# Patient Record
Sex: MALE | Race: WHITE | NOT HISPANIC OR LATINO | RURAL
[De-identification: names, ages, dates, MRNs, and addresses within clinical notes are randomized per-mention and may not be internally consistent; named-entity substitution may affect disease eponyms.]

---

## 2021-05-29 ENCOUNTER — HOSPITAL ENCOUNTER (EMERGENCY)
Facility: HOSPITAL | Age: 51
Discharge: LEFT BEFORE DISCHARGE | End: 2021-05-29
Payer: MEDICAID

## 2021-05-29 VITALS
BODY MASS INDEX: 22.68 KG/M2 | OXYGEN SATURATION: 99 % | TEMPERATURE: 98 F | WEIGHT: 162 LBS | DIASTOLIC BLOOD PRESSURE: 89 MMHG | HEART RATE: 73 BPM | RESPIRATION RATE: 18 BRPM | SYSTOLIC BLOOD PRESSURE: 148 MMHG | HEIGHT: 71 IN

## 2021-05-29 DIAGNOSIS — R07.9 CHEST PAIN: ICD-10-CM

## 2021-05-29 LAB
ALBUMIN SERPL BCP-MCNC: 4 G/DL (ref 3.5–5)
ALBUMIN/GLOB SERPL: 0.9 {RATIO}
ALP SERPL-CCNC: 76 U/L (ref 45–115)
ALT SERPL W P-5'-P-CCNC: 28 U/L (ref 16–61)
ANION GAP SERPL CALCULATED.3IONS-SCNC: 18 MMOL/L (ref 7–16)
AST SERPL W P-5'-P-CCNC: 45 U/L (ref 15–37)
BASOPHILS # BLD AUTO: 0.04 K/UL (ref 0–0.2)
BASOPHILS NFR BLD AUTO: 0.1 % (ref 0–1)
BILIRUB SERPL-MCNC: 0.6 MG/DL (ref 0–1.2)
BUN SERPL-MCNC: 16 MG/DL (ref 7–18)
BUN/CREAT SERPL: 16 (ref 6–20)
CALCIUM SERPL-MCNC: 8.8 MG/DL (ref 8.5–10.1)
CHLORIDE SERPL-SCNC: 104 MMOL/L (ref 98–107)
CO2 SERPL-SCNC: 23 MMOL/L (ref 21–32)
CREAT SERPL-MCNC: 1 MG/DL (ref 0.7–1.3)
DIFFERENTIAL METHOD BLD: ABNORMAL
EOSINOPHIL # BLD AUTO: 0.1 K/UL (ref 0–0.5)
EOSINOPHIL NFR BLD AUTO: 0.8 % (ref 1–4)
ERYTHROCYTE [DISTWIDTH] IN BLOOD BY AUTOMATED COUNT: 12.6 % (ref 11.5–14.5)
GLOBULIN SER-MCNC: 4.5 G/DL (ref 2–4)
GLUCOSE SERPL-MCNC: 124 MG/DL (ref 74–106)
HCT VFR BLD AUTO: 49.6 % (ref 40–54)
HGB BLD-MCNC: 17.1 G/DL (ref 13.5–18)
LYMPHOCYTES # BLD AUTO: 1.18 K/UL (ref 1–4.8)
LYMPHOCYTES NFR BLD AUTO: 9.4 % (ref 27–41)
MCH RBC QN AUTO: 33.1 PG (ref 27–31)
MCHC RBC AUTO-ENTMCNC: 34.5 G/DL (ref 32–36)
MCV RBC AUTO: 96.1 FL (ref 80–96)
MONOCYTES # BLD AUTO: 0.78 K/UL (ref 0–0.8)
MONOCYTES NFR BLD AUTO: 5.1 % (ref 2–6)
MPC BLD CALC-MCNC: 11.7 FL (ref 9.4–12.4)
NEUTROPHILS # BLD AUTO: 10.93 K/UL (ref 1.8–7.7)
NEUTROPHILS NFR BLD AUTO: 84.6 % (ref 53–65)
NT-PROBNP SERPL-MCNC: 26 PG/ML (ref 1–125)
PLATELET # BLD AUTO: 214 K/UL (ref 150–400)
POTASSIUM SERPL-SCNC: 5.7 MMOL/L (ref 3.5–5.1)
PROT SERPL-MCNC: 8.5 G/DL (ref 6.4–8.2)
RBC # BLD AUTO: 5.16 M/UL (ref 4.6–6.2)
SODIUM SERPL-SCNC: 139 MMOL/L (ref 136–145)
TROPONIN I SERPL-MCNC: <0.017 NG/ML
WBC # BLD AUTO: 13.57 K/UL (ref 4.5–11)

## 2021-05-29 PROCEDURE — 80053 COMPREHEN METABOLIC PANEL: CPT | Performed by: NURSE PRACTITIONER

## 2021-05-29 PROCEDURE — 84484 ASSAY OF TROPONIN QUANT: CPT | Performed by: NURSE PRACTITIONER

## 2021-05-29 PROCEDURE — 99283 PR EMERGENCY DEPT VISIT,LEVEL III: ICD-10-PCS | Mod: ,,, | Performed by: NURSE PRACTITIONER

## 2021-05-29 PROCEDURE — 94760 N-INVAS EAR/PLS OXIMETRY 1: CPT

## 2021-05-29 PROCEDURE — 83880 ASSAY OF NATRIURETIC PEPTIDE: CPT | Performed by: NURSE PRACTITIONER

## 2021-05-29 PROCEDURE — 36415 COLL VENOUS BLD VENIPUNCTURE: CPT | Performed by: NURSE PRACTITIONER

## 2021-05-29 PROCEDURE — 85025 COMPLETE CBC W/AUTO DIFF WBC: CPT | Performed by: NURSE PRACTITIONER

## 2021-05-29 PROCEDURE — 93005 ELECTROCARDIOGRAM TRACING: CPT

## 2021-05-29 PROCEDURE — 99284 EMERGENCY DEPT VISIT MOD MDM: CPT | Mod: 25

## 2021-05-29 PROCEDURE — 99283 EMERGENCY DEPT VISIT LOW MDM: CPT | Mod: ,,, | Performed by: NURSE PRACTITIONER

## 2021-05-29 PROCEDURE — 25000003 PHARM REV CODE 250: Performed by: NURSE PRACTITIONER

## 2021-05-29 PROCEDURE — 93010 ELECTROCARDIOGRAM REPORT: CPT | Mod: ,,, | Performed by: FAMILY MEDICINE

## 2021-05-29 PROCEDURE — 93010 EKG 12-LEAD: ICD-10-PCS | Mod: ,,, | Performed by: FAMILY MEDICINE

## 2021-05-29 RX ORDER — ASPIRIN 325 MG
325 TABLET ORAL
Status: COMPLETED | OUTPATIENT
Start: 2021-05-29 | End: 2021-05-29

## 2021-05-29 RX ADMIN — ASPIRIN 325 MG ORAL TABLET 325 MG: 325 PILL ORAL at 11:05

## 2024-01-01 ENCOUNTER — HOSPITAL ENCOUNTER (EMERGENCY)
Facility: HOSPITAL | Age: 54
End: 2024-05-26
Attending: EMERGENCY MEDICINE
Payer: MEDICAID

## 2024-01-01 VITALS
TEMPERATURE: 97 F | HEART RATE: 83 BPM | OXYGEN SATURATION: 100 % | SYSTOLIC BLOOD PRESSURE: 146 MMHG | DIASTOLIC BLOOD PRESSURE: 93 MMHG | RESPIRATION RATE: 31 BRPM

## 2024-01-01 DIAGNOSIS — I21.3 STEMI (ST ELEVATION MYOCARDIAL INFARCTION): ICD-10-CM

## 2024-01-01 DIAGNOSIS — I46.9 CARDIAC ARREST: Primary | ICD-10-CM

## 2024-01-01 DIAGNOSIS — I21.3 ST ELEVATION MYOCARDIAL INFARCTION (STEMI), UNSPECIFIED ARTERY: ICD-10-CM

## 2024-01-01 PROCEDURE — 31500 INSERT EMERGENCY AIRWAY: CPT

## 2024-01-01 PROCEDURE — 63600175 PHARM REV CODE 636 W HCPCS: Performed by: INTERNAL MEDICINE

## 2024-01-01 PROCEDURE — 25000003 PHARM REV CODE 250: Performed by: INTERNAL MEDICINE

## 2024-01-01 PROCEDURE — C1725 CATH, TRANSLUMIN NON-LASER: HCPCS | Performed by: INTERNAL MEDICINE

## 2024-01-01 PROCEDURE — 92950 HEART/LUNG RESUSCITATION CPR: CPT

## 2024-01-01 PROCEDURE — 93458 L HRT ARTERY/VENTRICLE ANGIO: CPT | Mod: XU | Performed by: INTERNAL MEDICINE

## 2024-01-01 PROCEDURE — 92920 PRQ TRLUML C ANGIOP 1ART&/BR: CPT | Mod: LC,,, | Performed by: INTERNAL MEDICINE

## 2024-01-01 PROCEDURE — C1769 GUIDE WIRE: HCPCS | Performed by: INTERNAL MEDICINE

## 2024-01-01 PROCEDURE — 27201423 OPTIME MED/SURG SUP & DEVICES STERILE SUPPLY: Performed by: INTERNAL MEDICINE

## 2024-01-01 PROCEDURE — 94002 VENT MGMT INPAT INIT DAY: CPT

## 2024-01-01 PROCEDURE — 99285 EMERGENCY DEPT VISIT HI MDM: CPT | Mod: 25

## 2024-01-01 PROCEDURE — C1887 CATHETER, GUIDING: HCPCS | Performed by: INTERNAL MEDICINE

## 2024-01-01 PROCEDURE — 85347 COAGULATION TIME ACTIVATED: CPT

## 2024-01-01 PROCEDURE — 92920 PRQ TRLUML C ANGIOP 1ART&/BR: CPT | Mod: LC | Performed by: INTERNAL MEDICINE

## 2024-01-01 PROCEDURE — 92950 HEART/LUNG RESUSCITATION CPR: CPT | Mod: 51,,, | Performed by: INTERNAL MEDICINE

## 2024-01-01 PROCEDURE — 25500020 PHARM REV CODE 255: Performed by: INTERNAL MEDICINE

## 2024-01-01 PROCEDURE — C1894 INTRO/SHEATH, NON-LASER: HCPCS | Performed by: INTERNAL MEDICINE

## 2024-01-01 PROCEDURE — 99900035 HC TECH TIME PER 15 MIN (STAT)

## 2024-01-01 PROCEDURE — 27000221 HC OXYGEN, UP TO 24 HOURS

## 2024-01-01 PROCEDURE — 93458 L HRT ARTERY/VENTRICLE ANGIO: CPT | Mod: 26,XU,51, | Performed by: INTERNAL MEDICINE

## 2024-01-01 PROCEDURE — 63600150 PHARM REV CODE 636: Performed by: INTERNAL MEDICINE

## 2024-01-01 RX ORDER — DOPAMINE HYDROCHLORIDE 320 MG/100ML
INJECTION, SOLUTION INTRAVENOUS
Status: DISCONTINUED | OUTPATIENT
Start: 2024-01-01 | End: 2024-07-31 | Stop reason: HOSPADM

## 2024-01-01 RX ORDER — LIDOCAINE HYDROCHLORIDE 20 MG/ML
INJECTION INTRAVENOUS
Status: DISCONTINUED | OUTPATIENT
Start: 2024-01-01 | End: 2024-07-31 | Stop reason: HOSPADM

## 2024-01-01 RX ORDER — EPINEPHRINE 0.1 MG/ML
INJECTION INTRAVENOUS
Status: DISCONTINUED | OUTPATIENT
Start: 2024-01-01 | End: 2024-07-31 | Stop reason: HOSPADM

## 2024-01-01 RX ORDER — AMIODARONE HYDROCHLORIDE 150 MG/3ML
INJECTION, SOLUTION INTRAVENOUS
Status: DISCONTINUED | OUTPATIENT
Start: 2024-01-01 | End: 2024-07-31 | Stop reason: HOSPADM

## 2024-01-01 RX ORDER — INDOMETHACIN 25 MG/1
CAPSULE ORAL
Status: DISCONTINUED | OUTPATIENT
Start: 2024-01-01 | End: 2024-07-31 | Stop reason: HOSPADM

## 2024-01-01 RX ORDER — SODIUM CHLORIDE 9 MG/ML
INJECTION, SOLUTION INTRAVENOUS
Status: DISCONTINUED | OUTPATIENT
Start: 2024-01-01 | End: 2024-07-31 | Stop reason: HOSPADM

## 2024-01-01 RX ORDER — HEPARIN SODIUM 1000 [USP'U]/ML
INJECTION, SOLUTION INTRAVENOUS; SUBCUTANEOUS
Status: DISCONTINUED | OUTPATIENT
Start: 2024-01-01 | End: 2024-07-31 | Stop reason: HOSPADM

## 2024-01-01 RX ORDER — LIDOCAINE HYDROCHLORIDE 10 MG/ML
INJECTION INFILTRATION; PERINEURAL
Status: DISCONTINUED | OUTPATIENT
Start: 2024-01-01 | End: 2024-07-31 | Stop reason: HOSPADM

## 2024-05-26 ENCOUNTER — HOSPITAL ENCOUNTER (EMERGENCY)
Facility: HOSPITAL | Age: 54
Discharge: ANOTHER HOSPITAL | End: 2024-05-26
Attending: EMERGENCY MEDICINE
Payer: MEDICAID

## 2024-05-26 VITALS
BODY MASS INDEX: 23.71 KG/M2 | WEIGHT: 170 LBS | SYSTOLIC BLOOD PRESSURE: 180 MMHG | OXYGEN SATURATION: 99 % | DIASTOLIC BLOOD PRESSURE: 100 MMHG | RESPIRATION RATE: 20 BRPM | TEMPERATURE: 95 F | HEART RATE: 89 BPM

## 2024-05-26 DIAGNOSIS — I21.3 ACUTE ST ELEVATION MYOCARDIAL INFARCTION (STEMI), UNSPECIFIED ARTERY: ICD-10-CM

## 2024-05-26 DIAGNOSIS — I46.9 CARDIOPULMONARY ARREST: Primary | ICD-10-CM

## 2024-05-26 PROBLEM — R07.2 PRECORDIAL PAIN: Status: ACTIVE | Noted: 2024-05-26

## 2024-05-26 PROBLEM — Z72.0 TOBACCO ABUSE: Status: RESOLVED | Noted: 2024-05-26 | Resolved: 2024-05-26

## 2024-05-26 PROBLEM — Z72.0 TOBACCO ABUSE: Status: ACTIVE | Noted: 2024-05-26

## 2024-05-26 PROBLEM — R40.2440: Status: RESOLVED | Noted: 2024-05-26 | Resolved: 2024-05-26

## 2024-05-26 PROBLEM — R40.2440: Status: ACTIVE | Noted: 2024-05-26

## 2024-05-26 LAB
ALBUMIN SERPL BCP-MCNC: 3.2 G/DL (ref 3.5–5)
ALBUMIN/GLOB SERPL: 1 {RATIO}
ALP SERPL-CCNC: 78 U/L (ref 45–115)
ALT SERPL W P-5'-P-CCNC: 57 U/L (ref 16–61)
ANION GAP SERPL CALCULATED.3IONS-SCNC: 27 MMOL/L (ref 7–16)
AST SERPL W P-5'-P-CCNC: 91 U/L (ref 15–37)
BACTERIA #/AREA URNS HPF: ABNORMAL /HPF
BASOPHILS # BLD AUTO: 0.03 K/UL (ref 0–0.2)
BASOPHILS NFR BLD AUTO: 0.3 % (ref 0–1)
BILIRUB SERPL-MCNC: 0.6 MG/DL (ref ?–1.2)
BILIRUB UR QL STRIP: NEGATIVE
BUN SERPL-MCNC: 20 MG/DL (ref 7–18)
BUN/CREAT SERPL: 11 (ref 6–20)
CALCIUM SERPL-MCNC: 7 MG/DL (ref 8.5–10.1)
CHLORIDE SERPL-SCNC: 104 MMOL/L (ref 98–107)
CLARITY UR: CLEAR
CO2 SERPL-SCNC: 13 MMOL/L (ref 21–32)
COLOR UR: YELLOW
CREAT SERPL-MCNC: 1.75 MG/DL (ref 0.7–1.3)
DIFFERENTIAL METHOD BLD: ABNORMAL
EGFR (NO RACE VARIABLE) (RUSH/TITUS): 45 ML/MIN/1.73M2
EOSINOPHIL # BLD AUTO: 0.01 K/UL (ref 0–0.5)
EOSINOPHIL NFR BLD AUTO: 0.1 % (ref 1–4)
ERYTHROCYTE [DISTWIDTH] IN BLOOD BY AUTOMATED COUNT: 11.8 % (ref 11.5–14.5)
GLOBULIN SER-MCNC: 3.3 G/DL (ref 2–4)
GLUCOSE SERPL-MCNC: 189 MG/DL (ref 74–106)
GLUCOSE UR STRIP-MCNC: NEGATIVE MG/DL
HCO3 UR-SCNC: 13 MMOL/L (ref 21–28)
HCT VFR BLD AUTO: 44.4 % (ref 40–54)
HGB BLD-MCNC: 14.3 G/DL (ref 13.5–18)
KETONES UR STRIP-SCNC: 15 MG/DL
LEUKOCYTE ESTERASE UR QL STRIP: ABNORMAL
LYMPHOCYTES # BLD AUTO: 3.18 K/UL (ref 1–4.8)
LYMPHOCYTES NFR BLD AUTO: 32 % (ref 27–41)
LYMPHOCYTES NFR BLD MANUAL: 33 % (ref 27–41)
MCH RBC QN AUTO: 31 PG (ref 27–31)
MCHC RBC AUTO-ENTMCNC: 32.2 G/DL (ref 32–36)
MCV RBC AUTO: 96.1 FL (ref 80–96)
MONOCYTES # BLD AUTO: 0.42 K/UL (ref 0–0.8)
MONOCYTES NFR BLD AUTO: 4.2 % (ref 2–6)
MONOCYTES NFR BLD MANUAL: 6 % (ref 2–6)
MPC BLD CALC-MCNC: 10.2 FL (ref 9.4–12.4)
NEUTROPHILS # BLD AUTO: 6.3 K/UL (ref 1.8–7.7)
NEUTROPHILS NFR BLD AUTO: 63.4 % (ref 53–65)
NEUTS SEG NFR BLD MANUAL: 61 % (ref 50–62)
NITRITE UR QL STRIP: NEGATIVE
NRBC BLD MANUAL-RTO: NORMAL %
PCO2 BLDA: 45 MMHG (ref 35–48)
PH SMN: 7.07 [PH] (ref 7.35–7.45)
PH UR STRIP: 5.5 PH UNITS
PLATELET # BLD AUTO: 157 K/UL (ref 150–400)
PLATELET MORPHOLOGY: NORMAL
PO2 BLDA: 229 MMHG (ref 83–108)
POC BASE EXCESS: -16.7 MMOL/L (ref -2–3)
POC CO2: 14.4 MMOL/L
POC SATURATED O2: 100 %
POTASSIUM SERPL-SCNC: 3.8 MMOL/L (ref 3.5–5.1)
PROT SERPL-MCNC: 6.5 G/DL (ref 6.4–8.2)
PROT UR QL STRIP: ABNORMAL
RBC # BLD AUTO: 4.62 M/UL (ref 4.6–6.2)
RBC # UR STRIP: ABNORMAL /UL
RBC #/AREA URNS HPF: ABNORMAL /HPF
RBC MORPH BLD: NORMAL
SODIUM SERPL-SCNC: 140 MMOL/L (ref 136–145)
SP GR UR STRIP: 1.02
SQUAMOUS #/AREA URNS LPF: ABNORMAL /LPF
TRICHOMONAS #/AREA URNS HPF: ABNORMAL /HPF
TROPONIN I SERPL DL<=0.01 NG/ML-MCNC: 2141.1 PG/ML
UROBILINOGEN UR STRIP-ACNC: 0.2 MG/DL
WBC # BLD AUTO: 9.94 K/UL (ref 4.5–11)
WBC #/AREA URNS HPF: ABNORMAL /HPF

## 2024-05-26 PROCEDURE — 27000221 HC OXYGEN, UP TO 24 HOURS

## 2024-05-26 PROCEDURE — 27100000 HC BAG AMBU

## 2024-05-26 PROCEDURE — 99285 EMERGENCY DEPT VISIT HI MDM: CPT | Mod: 25

## 2024-05-26 PROCEDURE — 80053 COMPREHEN METABOLIC PANEL: CPT | Performed by: EMERGENCY MEDICINE

## 2024-05-26 PROCEDURE — 96374 THER/PROPH/DIAG INJ IV PUSH: CPT | Mod: 59

## 2024-05-26 PROCEDURE — 85025 COMPLETE CBC W/AUTO DIFF WBC: CPT | Performed by: EMERGENCY MEDICINE

## 2024-05-26 PROCEDURE — 84484 ASSAY OF TROPONIN QUANT: CPT | Performed by: EMERGENCY MEDICINE

## 2024-05-26 PROCEDURE — 25000003 PHARM REV CODE 250

## 2024-05-26 PROCEDURE — 99285 EMERGENCY DEPT VISIT HI MDM: CPT | Mod: ,,, | Performed by: EMERGENCY MEDICINE

## 2024-05-26 PROCEDURE — 25000003 PHARM REV CODE 250: Performed by: EMERGENCY MEDICINE

## 2024-05-26 PROCEDURE — 36415 COLL VENOUS BLD VENIPUNCTURE: CPT | Performed by: EMERGENCY MEDICINE

## 2024-05-26 PROCEDURE — 94761 N-INVAS EAR/PLS OXIMETRY MLT: CPT | Mod: XB

## 2024-05-26 PROCEDURE — 82803 BLOOD GASES ANY COMBINATION: CPT

## 2024-05-26 PROCEDURE — 63600175 PHARM REV CODE 636 W HCPCS: Performed by: EMERGENCY MEDICINE

## 2024-05-26 PROCEDURE — 31500 INSERT EMERGENCY AIRWAY: CPT

## 2024-05-26 PROCEDURE — 27100116 HC DETECTOR CO2

## 2024-05-26 PROCEDURE — 81003 URINALYSIS AUTO W/O SCOPE: CPT | Performed by: EMERGENCY MEDICINE

## 2024-05-26 PROCEDURE — 36600 WITHDRAWAL OF ARTERIAL BLOOD: CPT | Mod: XB

## 2024-05-26 RX ORDER — ETOMIDATE 2 MG/ML
INJECTION INTRAVENOUS
Status: COMPLETED
Start: 2024-05-26 | End: 2024-05-26

## 2024-05-26 RX ORDER — HEPARIN SODIUM 5000 [USP'U]/ML
4000 INJECTION, SOLUTION INTRAVENOUS; SUBCUTANEOUS
Status: COMPLETED | OUTPATIENT
Start: 2024-05-26 | End: 2024-05-26

## 2024-05-26 RX ORDER — ASPIRIN 300 MG/1
300 SUPPOSITORY RECTAL
Status: COMPLETED | OUTPATIENT
Start: 2024-05-26 | End: 2024-05-26

## 2024-05-26 RX ORDER — ROCURONIUM BROMIDE 10 MG/ML
INJECTION, SOLUTION INTRAVENOUS
Status: COMPLETED
Start: 2024-05-26 | End: 2024-05-26

## 2024-05-26 RX ADMIN — HEPARIN SODIUM 4000 UNITS: 5000 INJECTION, SOLUTION INTRAVENOUS; SUBCUTANEOUS at 12:05

## 2024-05-26 RX ADMIN — ASPIRIN 300 MG: 300 SUPPOSITORY RECTAL at 12:05

## 2024-05-26 RX ADMIN — ROCURONIUM BROMIDE 50 MG: 10 INJECTION, SOLUTION INTRAVENOUS at 11:05

## 2024-05-26 RX ADMIN — ETOMIDATE 20 MG: 2 INJECTION INTRAVENOUS at 11:05

## 2024-05-26 NOTE — ED NOTES
Vtach noted on monitor, provider states to shock, 200 J delivered and now in st with st seg elevation

## 2024-05-26 NOTE — ED NOTES
I-Gel removed and 7.5 ETT tube placed without diff, good breath sounds noted bilaterally, positive color change on CO2 detector, secured at 25cm at the lip, ambu back in use sats 99%

## 2024-05-26 NOTE — ED TRIAGE NOTES
Patient arrives via [facility] transferring from [facility] as a post cardiac arrest patient in need of heart catheterization. Patient on arrival is intubated and vitally stable. Patient received a total of 3 epis, 1 amp of bicarb during initial cardiac arrest. Patient also given 4,000 units of heparin and 300mg of rectal ASA at [facility]. Patient triaged and rolled to cath lab shortly after.

## 2024-05-26 NOTE — ED NOTES
Went to give ticagrelor, was verifying ng placement and could not hear any air over epigastric area, no stomach contents returnned, [facility] pulled ng out and plan on replacing when gets in air, provider aware

## 2024-05-26 NOTE — ED PROVIDER NOTES
"Encounter Date: 2024       History     Chief Complaint   Patient presents with    Cardiac Arrest     Found 30 min pta by family face down and "purple", when transferred to bed Shaun stopped, and st with elevated st seg noted, carotid pulse palpated, pt is unresponsive     PT IS A 53 YR OLD WM CARDIAC ARREST TRANSPORTED PER EMS FOUND IN FLOOR, FAMILY PERFORMING CPR ON EMS ARRIVAL.  INITIAL RHYTHM ASYSTOLE, 3 EPI, 1 BICARB ADMINISTERED  IGEL AIRWAY PLACED PTA  IO ; IV LINE PTA    PT LAST KNOWN WELL LAST PM AND HAD " TROUBLE BREATHING ALL WEEK"  PER FAMILY MEMBER  PT SMOKES AND DRINKS  ETOH PER FAMILY MEMBERS    PT HERE  WITH CP WITH ABNORMAL EKG AN LEFT PRIOR TO 2 TROPONIN  FATHER  MI AGE 38 YRS OLD      The history is provided by the EMS personnel. The history is limited by the condition of the patient.     Review of patient's allergies indicates:  No Known Allergies  No past medical history on file.  No past surgical history on file.  No family history on file.  Social History     Tobacco Use    Smoking status: Every Day    Smokeless tobacco: Never   Substance Use Topics    Alcohol use: Yes     Comment: weekly    Drug use: Yes     Types: Marijuana     Review of Systems   Unable to perform ROS: Acuity of condition       Physical Exam     Initial Vitals   BP Pulse Resp Temp SpO2   24 1058 24 1058 24 1141 24 1111 24 1111   120/76 (!) 119 18 96.8 °F (36 °C) 99 %      MAP       --                Physical Exam    Nursing note and vitals reviewed.  Constitutional: He is cooperative. He appears distressed.   HENT:   Head: Normocephalic and atraumatic.   Right Ear: External ear normal.   Left Ear: External ear normal.   Nose: Nose normal.   Mouth/Throat: Oropharynx is clear and moist.   PT POSSIBLY FELL AS HE WAS IN FLOOR, NO PALPABLE HEMATOMA NOTED  PUPILS FIXED AND DILATED 7 MM   Neck: Trachea normal.   C COLLAR INTACT   Cardiovascular:  Normal rate.           No murmur " heard.  Pulses:       Radial pulses are 3+ on the right side and 3+ on the left side.        Dorsalis pedis pulses are 3+ on the right side and 3+ on the left side.   INTACT CAROTID PULSE , NO PALPABLE BRACHIAL PULSE   Pulmonary/Chest:   I GEL INTACT, BS EQUAL AND ADEQUATE   Abdominal: Abdomen is soft. Bowel sounds are normal. He exhibits no distension.   Musculoskeletal:         General: No edema.      Right shoulder: Normal.      Left shoulder: Normal.      Right upper arm: Normal.      Left upper arm: Normal.      Right elbow: Normal.      Left elbow: Normal.      Right forearm: Normal.      Left forearm: Normal.      Right wrist: Normal.      Left wrist: Normal.      Right hand: Normal.      Left hand: Normal.      Comments: UNRESPONSIVE     Lymphadenopathy:     He has no cervical adenopathy.     He has no axillary adenopathy.   Neurological: He displays a negative Romberg sign. GCS eye subscore is 4. GCS verbal subscore is 5. GCS motor subscore is 6.   Reflex Scores:       Bicep reflexes are 2+ on the right side and 2+ on the left side.       Patellar reflexes are 2+ on the right side and 2+ on the left side.  Skin: Skin is dry. Capillary refill takes more than 3 seconds. No rash noted. No erythema.   COOL, CYANOTIC   Psychiatric: His speech is normal. Cognition and memory are normal.   UNRESPONSIVE         Medical Screening Exam   See Full Note    ED Course   Procedures  Labs Reviewed   COMPREHENSIVE METABOLIC PANEL - Abnormal; Notable for the following components:       Result Value    CO2 13 (*)     Anion Gap 27 (*)     Glucose 189 (*)     BUN 20 (*)     Creatinine 1.75 (*)     Calcium 7.0 (*)     Albumin 3.2 (*)     AST 91 (*)     eGFR 45 (*)     All other components within normal limits   TROPONIN I - Abnormal; Notable for the following components:    Troponin I High Sensitivity 2,141.1 (*)     All other components within normal limits   CBC WITH DIFFERENTIAL - Abnormal; Notable for the following  components:    MCV 96.1 (*)     Eosinophils % 0.1 (*)     All other components within normal limits   URINALYSIS, REFLEX TO URINE CULTURE - Abnormal; Notable for the following components:    Leukocytes, UA Trace (*)     Protein, UA Trace (*)     Ketones, UA 15 (*)     Blood, UA Small (*)     All other components within normal limits   URINALYSIS, MICROSCOPIC - Abnormal; Notable for the following components:    WBC, UA 5-10 (*)     RBC, UA 3-5 (*)     Bacteria, UA Few (*)     Trichomonas, UA Few (*)     All other components within normal limits   CBC W/ AUTO DIFFERENTIAL    Narrative:     The following orders were created for panel order CBC auto differential.  Procedure                               Abnormality         Status                     ---------                               -----------         ------                     CBC with Differential[[id number]]        Abnormal            Final result               Manual Differential[[id number]]                              Final result                 Please view results for these tests on the individual orders.   MANUAL DIFFERENTIAL     EKG Readings: (Independently Interpreted)   Initial Reading: STEMI. Previous EKG: Compared with most recent EKG Previous EKG Date: 5/29/2021. Heart Rate: 113. Conduction: RBBB. ST Segment Depression: II, III and AVF. T Waves Elevated: AVR, V1, V2, V3, V4, V5 and V6.   1106 AM        1214   IMPROVED ST SEGMENTS SEE MEDIA  12 LEAD PER [facility]         Imaging Results              CT Cervical Spine Without Contrast (Final result)  Result time 05/26/24 12:23:57      Final result by Praneeth Morley MD (05/26/24 12:23:57)                   Impression:      No acute cervical spine fracture    Degenerative disc disease cervical spine    Nasogastric tube is looped within the hypopharynx region      Electronically signed by: Praneeth Morley  Date:    05/26/2024  Time:    12:23               Narrative:    EXAMINATION:  CT CERVICAL  SPINE WITHOUT CONTRAST    CLINICAL HISTORY:  Neck trauma (Age >= 65y);    TECHNIQUE:  Thin spiral CT sections were obtained through the cervical spine without contrast. Multiplanar reconstruction images are also evaluated.    The CT examination was performed using one or more of the following dose reduction techniques: Automated exposure control, adjustment of the mA and kV according to patient's size, use of acute or iterative reconstruction techniques.    COMPARISON:  No previous spine CT available    FINDINGS:  There is no acute cervical fracture or prevertebral soft tissue swelling.  There is no posttraumatic subluxation.  There is slight straightening of the spine which may be related to muscle spasm or patient positioning.  There is mild to moderate degenerative disc narrowing and moderate anterior spondylosis at C6-C7.  There is scattered mild-to-moderate spondylosis otherwise.  There is scattered mild to moderate facet arthropathy.    There is no gross disc extrusion.  There is mild narrowing of the spinal canal at C6-C7 secondary to posterior bulging disc and osteophyte complex.    Endotracheal tube is noted in the partially visualized trachea.    Nasogastric tube is looped upon itself in the hypopharynx region.                                       CT Head Without Contrast (Final result)  Result time 05/26/24 12:20:15      Final result by Praneeth Morley MD (05/26/24 12:20:15)                   Impression:      No definite acute intracranial process is identified at this time.      Electronically signed by: Praneeth Morley  Date:    05/26/2024  Time:    12:20               Narrative:    EXAMINATION:  CT head without contrast    CLINICAL HISTORY:  Mental status change, unknown cause;    TECHNIQUE:  Transaxial CT sections were obtained through the brain without contrast.    The CT examination was performed using one or more of the following dose reduction techniques: Automated exposure control,  adjustment of the mA and kV according to patient's size, use of acute or iterative reconstruction techniques.    COMPARISON:  No previous similar head CT    FINDINGS:  The ventricles are midline in position without evidence of hydrocephalus. There is no mass or area of parenchymal hemorrhage. There is no gross CT evidence of acute cortical stroke. There is no extra-axial hematoma. The partially visualized sinuses are generally clear. There is no obvious skull fracture.                                       X-Ray Chest AP Portable (Final result)  Result time 05/26/24 12:18:07      Final result by Praneeth Morley MD (05/26/24 12:18:07)                   Impression:      Endotracheal tube is well positioned    Nasogastric tube is not seen at the thoracic level    Asymmetric infiltrate or pulmonary edema right upper lung      Electronically signed by: Praneeth Morley  Date:    05/26/2024  Time:    12:18               Narrative:    EXAMINATION:  XR CHEST AP PORTABLE    CLINICAL HISTORY:  Shortness of breath    COMPARISON:  05/09/2021    TECHNIQUE:  AP portable chest    FINDINGS:  Endotracheal tube tip overlies the trachea well superior to the lydia.  Nasogastric tube is not seen at the thoracic level.    Cardiomediastinal silhouette is unremarkable.  There is no pulmonary vascular engorgement.    There is some hazy asymmetry over the right upper lung which could represent pneumonia or aspiration of gastric contents.  Acute asymmetric pulmonary edema is also a consideration.    There is no gross pleural effusion.    Osseous structures are similar                                    X-Rays:   Independently Interpreted Readings:   Chest X-Ray: Viewed and Other Results - Imaging    Updated   Order   05/26/24 1226  CT Cervical Spine Without Contrast  Performed: 05/26/24 1204  Final         Impression: No acute cervical spine fracture Degenerative disc disease cervical spine Nasogastric tube is looped within the  hypopharynx region Electronically signed by: Praneeth Morley Date: 05/26/2024...    05/26/24 1222  CT Head Without Contrast  Performed: 05/26/24 1204  Final         Impression: No definite acute intracranial process is identified at this time. Electronically signed by: Praneeth Morley Date: 05/26/2024 Time: 12:20    05/26/24 1220  X-Ray Chest AP Portable  Performed: 05/26/24 1203  Final         Impression: Endotracheal tube is well positioned Nasogastric tube is not seen at the thoracic level Asymmetric infiltrate or pulmonary edema right upper lung Electronically signed by: Praneeth Palacios...         Head CT: Viewed and Other Results - Imaging    Updated   Order   05/26/24 1226  CT Cervical Spine Without Contrast  Performed: 05/26/24 1204  Final         Impression: No acute cervical spine fracture Degenerative disc disease cervical spine Nasogastric tube is looped within the hypopharynx region Electronically signed by: Praneeth Morley Date: 05/26/2024...    05/26/24 1222  CT Head Without Contrast  Performed: 05/26/24 1204  Final         Impression: No definite acute intracranial process is identified at this time. Electronically signed by: Praneeth Morley Date: 05/26/2024 Time: 12:20    05/26/24 1220  X-Ray Chest AP Portable  Performed: 05/26/24 1203  Final         Impression: Endotracheal tube is well positioned Nasogastric tube is not seen at the thoracic level Asymmetric infiltrate or pulmonary edema right upper lung Electronically signed by: Praneeth Palacios...         Other Readings:  Viewed and Other Results - Imaging    Updated   Order   05/26/24 1226  CT Cervical Spine Without Contrast  Performed: 05/26/24 1204  Final         Impression: No acute cervical spine fracture Degenerative disc disease cervical spine Nasogastric tube is looped within the hypopharynx region Electronically signed by: Praneeth Morley Date: 05/26/2024...    05/26/24 1222  CT Head Without Contrast  Performed:  "24 1204  Final         Impression: No definite acute intracranial process is identified at this time. Electronically signed by: Praneeth Morley Date: 2024 Time: 12:20    24 1220  X-Ray Chest AP Portable  Performed: 24 1203  Final         Impression: Endotracheal tube is well positioned Nasogastric tube is not seen at the thoracic level Asymmetric infiltrate or pulmonary edema right upper lung Electronically signed by: Praneeth Morley Date...          Medications   rocuronium 10 mg/mL injection (50 mg  Given 24 1121)   etomidate (AMIDATE) 2 mg/mL injection (20 mg Intravenous Given 24 1118)   heparin (porcine) injection 4,000 Units (4,000 Units Intravenous Given 24 1207)   aspirin suppository 300 mg (300 mg Rectal Given 24 1215)     Medical Decision Making  PT IS A 53 YR OLD WM CARDIAC ARREST TRANSPORTED PER EMS FOUND IN FLOOR, FAMILY PERFORMING CPR ON EMS ARRIVAL.  INITIAL RHYTHM ASYSTOLE, 3 EPI, 1 BICARB ADMINISTERED  IGEL AIRWAY PLACED PTA  IO ; IV LINE PTA    PT LAST KNOWN WELL LAST PM AND HAD " TROUBLE BREATHING ALL WEEK"  PER FAMILY MEMBER  PT SMOKES AND DRINKS  ETOH PER FAMILY MEMBERS    PT HERE  WITH CP WITH ABNORMAL EKG AN LEFT PRIOR TO 2 TROPONIN  FATHER  MI AGE 38 YRS OLD      Amount and/or Complexity of Data Reviewed  Labs: ordered.     Details: Viewed and Other Results - Labs    Updated   Order   24 1228  POCT ARTERIAL BLOOD GAS  Collected: 24 1158  Final result      POC PH 7.07 Low Panic   POC PCO2 45 mmHg  POC PO2 229 High  mmHg  POC HCO3 13.0 Low  mmol/L  POC CO2 14.4 mmol/L  POC Base Excess -16.7 Low  mmol/L  POC SATURATED O2 100 %       24 1204  Urinalysis, Microscopic  Collected: 24 1133  Final result  Specimen: Urine, Catheterized      WBC, UA 5-10 Abnormal  /hpf  RBC, UA 3-5 Abnormal  /hpf  Bacteria, UA Few Abnormal  /hpf  Squamous Epithelial Cells, UA Rare /lpf  Trichomonas, UA Few Abnormal  /hpf       24 " 1158  Urinalysis, Reflex to Urine Culture Urine, Clean Catch  Collected: 05/26/24 1133  Final result  Specimen: Urine, Catheterized      Color, UA Yellow  Clarity, UA Clear  pH, UA 5.5 pH Units  Leukocytes, UA Trace Abnormal   Nitrites, UA Negative  Protein, UA Trace Abnormal   Glucose, UA Negative mg/dL  Ketones, UA 15 Abnormal  mg/dL  Urobilinogen, UA 0.2 mg/dL  Bilirubin, UA Negative  Blood, UA Small Abnormal   Specific Gravity, UA 1.025       05/26/24 1153  CBC auto differential  Collected: 05/26/24 1127  Final result  Specimen: Blood         05/26/24 1153  CBC with Differential  Collected: 05/26/24 1127  Final result  Specimen: Blood      WBC 9.94 K/uL  RBC 4.62 M/uL  Hemoglobin 14.3 g/dL  Hematocrit 44.4 %  MCV 96.1 High  fL  MCH 31.0 pg  MCHC 32.2 g/dL  RDW 11.8 %  Platelet Count 157 K/uL  MPV 10.2 fL  Neutrophils % 63.4 %  Lymphocytes % 32.0 %  Neutrophils, Abs 6.30 K/uL  Lymphocytes, Absolute 3.18 K/uL  Diff Type Manual  Monocytes % 4.2 %  Eosinophils % 0.1 Low  %  Basophils % 0.3 %  Monocytes, Absolute 0.42 K/uL  Eosinophils, Absolute 0.01 K/uL  Basophils, Absolute 0.03 K/uL       05/26/24 1153  Manual Differential  Collected: 05/26/24 1127  Final result  Specimen: Blood      Segmented Neutrophils, Man % 61 %  Lymphocytes, Man % 33 %  Monocytes, Man % 6 %  nRBC, Manual -  Platelet Morphology Normal  RBC Morphology Normal       05/26/24 1148  Comprehensive metabolic panel  Collected: 05/26/24 1127  Final result  Specimen: Blood      Sodium 140 mmol/L  Potassium 3.8 mmol/L  Chloride 104 mmol/L  CO2 13 Low  mmol/L  Anion Gap 27 High  mmol/L  Glucose 189 High  mg/dL  BUN 20 High  mg/dL  Creatinine 1.75 High  mg/dL  BUN/Creatinine Ratio 11  Calcium 7.0 Low Panic  mg/dL  Total Protein 6.5 g/dL  Albumin 3.2 Low  g/dL  Globulin 3.3 g/dL  A/G Ratio 1.0  Bilirubin, Total 0.6 mg/dL  Alk Phos 78 U/L  ALT 57 U/L  AST 91 High  U/L  eGFR 45 Low  mL/min/1.73m2       05/26/24 1146  Troponin I  Collected: 05/26/24  1127  Final result  Specimen: Blood      Troponin I High Sensitivity 2,141.1 High Panic  pg/mL        Radiology: ordered.     Details: Viewed and Other Results - Imaging    Updated   Order   05/26/24 1226  CT Cervical Spine Without Contrast  Performed: 05/26/24 1204  Final         Impression: No acute cervical spine fracture Degenerative disc disease cervical spine Nasogastric tube is looped within the hypopharynx region Electronically signed by: Praneeth Morley Date: 05/26/2024...    05/26/24 1222  CT Head Without Contrast  Performed: 05/26/24 1204  Final         Impression: No definite acute intracranial process is identified at this time. Electronically signed by: Praneeth Morley Date: 05/26/2024 Time: 12:20    05/26/24 1220  X-Ray Chest AP Portable  Performed: 05/26/24 1203  Final         Impression: Endotracheal tube is well positioned Nasogastric tube is not seen at the thoracic level Asymmetric infiltrate or pulmonary edema right upper lung Electronically signed by: Praneeth Morley Date...        ECG/medicine tests: ordered.     Details: ED EKG STEMI ANT LAT    ALSO  SEE [facility] 12 LEAD ATTACHED  OBTAINED ON ARRIVAL   Discussion of management or test interpretation with external provider(s): EXAM  PUPILS FIXED AND DILATED, AFTER ROSC PUPILS 3 MM TO 2 MM RACTIVE AND ON [facility] ARRIVAL 1 MM EQUAL PER [facility]  INITIAL RHYTHM SINUS TACH WITH ST ELEVATION THEN VTACH WITH DEFIB X1 200 J ,THEN SINUS TACH WITH ST ELEVATION  ROSC, PT NOTED PER STAFF TO MOVE HAND AND NOTED AGONAL RESPIRATIONS  IGEL CHANGED TO 7.5 ET TUBE WITH COLOR CHANGE PER CO2 DETECTOR AND EQUAL BS, ETOMIDATE AND ROCURONIUM ADMINISTERED  EKG STEMI ANT LATERAL RATE 113  ON [facility] ARRIVAL  PT HAD RHYTHM CHANGE TO NSR 12 LEAD NOTED PER [facility]  IVF 1500 ML  ASA SUPP AND HEPARIN ADMINISTERED  BRILLINTA ORDERED BUT NG NOT NOTED ON CXR  AND NOTED IN MOUTH ON CT C SPINE NOTIFIED [facility]    LABS AS ABOVE TROPONIN 2 K, , CO2 13, BUN  20/CREAT 1.75, CA 7, ALB 3.2, AST 91  CXR ET TUBE IS WELL POSITIONED, NG NOT NOTED  CT HEAD NEG  CT C SPINE NEG FOR FX, NG IS IN HYPOPHARYNX, REMOVED    [facility] TRANSFERRED PT TO [facility]  FOR EMERGENT CARDIAC CATH  PH 7.07 [facility] WILL GIVE BICARB, HAD 1 DOSE PER EMS PTA  DISCUSSED PER DR [facility]  WILL ARRANGE EMERGENT CATH AND DR LYNN ED ACCEPTS ED TO ED      Risk  OTC drugs.  Prescription drug management.                                      Clinical Impression:   Final diagnoses:  [I46.9] Cardiopulmonary arrest (Primary)  [I21.3] Acute ST elevation myocardial infarction (STEMI), unspecified artery        ED Disposition Condition    Transfer to Another Facility Stable                Melly Escobar MD  05/26/24 1459       Melly Escobar MD  05/26/24 1502       Melly Escobar MD  05/26/24 [id number]

## 2024-05-26 NOTE — H&P
[facility] - Emergency Department  Cardiology  History and Physical     Patient Name: Jackson Bautista  MRN: [id number]  Admission Date: 2024  Code Status: No Order   Attending Provider: No att. providers found   Primary Care Physician: No primary care provider on file.  Principal Problem:<principal problem not specified>    Patient information was obtained from ER records.     Subjective:     Chief Complaint: Cardiac arrest      HPI: Pts family found him down in the yard, called 911, in [place], underwent emergency intubation and ACLS protocol with ROSC.. He required several rounds of CPR/DC cardioversion.  He is currently intubated, is not responding to commands.     No past medical history on file. Unable to obtain, family in transent    No past surgical history on file.Unable to obtain, family in transent    Review of patient's allergies indicates:  No Known Allergies    Current Facility-Administered Medications on File Prior to Encounter   Medication    [COMPLETED] aspirin suppository 300 mg    [COMPLETED] etomidate (AMIDATE) 2 mg/mL injection    [COMPLETED] heparin (porcine) injection 4,000 Units    [COMPLETED] rocuronium 10 mg/mL injection    [DISCONTINUED] ticagrelor tablet 180 mg     No current outpatient medications on file prior to encounter.     Family History    None     Dad  of massive MI in mid fifties, hx available on previous ER report     Tobacco Use    Smoking status: Every Day    Smokeless tobacco: Never   Substance and Sexual Activity    Alcohol use: Yes     Comment: weekly    Drug use: Yes     Types: Marijuana    Sexual activity: Not on file     Review of Systems   Unable to perform ROS: Patient unresponsive     Objective:     Vital Signs (Most Recent):  Temp: 97.2 °F (36.2 °C) (24 1304)  Pulse: 83 (24 1311)  Resp: (!) 31 (24 1311)  BP: (!) 146/93 (24 1304)  SpO2: 100 % (24 131) Vital Signs (24h Range):  Temp:  [94.9 °F (34.9 °C)-97.2 °F (36.2 °C)] 97.2  °F (36.2 °C)  Pulse:  [] 83  Resp:  [18-31] 31  SpO2:  [98 %-100 %] 100 %  BP: (120-180)/() 146/93        There is no height or weight on file to calculate BMI.    SpO2: 100 %       No intake or output data in the 24 hours ending 05/26/24 1431    Lines/Drains/Airways       Drain  Duration                  Urethral Catheter 05/26/24 Straight-tip <1 day              Airway  Duration                  Airway - Non-Surgical 05/26/24 <1 day              Intraosseous Line  Duration                  Intraosseous Line 05/26/24 Tibia <1 day              Peripheral Intravenous Line  Duration                  Peripheral IV - Single Lumen 05/26/24 18 G Left Antecubital <1 day                    Physical Exam  Vitals and nursing note reviewed.   Constitutional:       Appearance: Normal appearance. He is obese.   HENT:      Head: Normocephalic and atraumatic.      Right Ear: External ear normal.      Left Ear: External ear normal.   Eyes:      General: No scleral icterus.        Right eye: No discharge.         Left eye: No discharge.      Conjunctiva/sclera: Conjunctivae normal.   Cardiovascular:      Rate and Rhythm: Regular rhythm. Tachycardia present.      Pulses: Normal pulses.      Heart sounds: Normal heart sounds. No murmur heard.     No friction rub. No gallop.   Pulmonary:      Effort: Pulmonary effort is normal.      Breath sounds: Normal breath sounds. No wheezing, rhonchi or rales.   Chest:      Chest wall: No tenderness.   Abdominal:      General: Abdomen is flat. Bowel sounds are normal. There is no distension.      Palpations: Abdomen is soft.      Tenderness: There is no abdominal tenderness. There is no guarding or rebound.   Musculoskeletal:         General: No swelling or tenderness.      Cervical back: Normal range of motion and neck supple.      Right lower leg: No edema.      Left lower leg: No edema.   Skin:     General: Skin is warm and dry.      Findings: No erythema or rash.   Neurological:  "     Mental Status: He is alert.      Sensory: Sensory deficit present.      Comments: Pt unresponsive,          Significant Labs: CMP   Recent Labs   Lab 05/26/24  1127      K 3.8      CO2 13*   *   BUN 20*   CREATININE 1.75*   CALCIUM 7.0*   PROT 6.5   ALBUMIN 3.2*   BILITOT 0.6   ALKPHOS 78   AST 91*   ALT 57   ANIONGAP 27*   , CBC   Recent Labs   Lab 05/26/24  1127   WBC 9.94   HGB 14.3   HCT 44.4      , and Troponin No results for input(s): "TROPONINI" in the last 48 hours.    Significant Imaging: Echocardiogram: Transthoracic echo (TTE) complete ([facility] Only): No results found for this or any previous visit.  Assessment and Plan:     Active Diagnoses:    Diagnosis Date Noted POA    Cardiac arrest [I46.9] 05/26/2024 Unknown    Coma with Gillian coma scale score not fully reported [R40.2440] 05/26/2024 Unknown    Tobacco abuse [Z72.0] 05/26/2024 Unknown    Precordial pain [R07.2] 05/26/2024 Unknown      Problems Resolved During this Admission:       VTE Risk Mitigation (From admission, onward)           Ordered     heparin (porcine) injection  Intra-op PRN         05/26/24 1329                    Lloyd Moctezuma, DO  Cardiology   [facility] - Emergency Department      "

## 2024-05-26 NOTE — ED TRIAGE NOTES
Pt arrived with jorge compessions in use, transferred to bed and lucus stopped for pulse check, positive carotid pulse palpated, cpr stopped and pt is unresponsive, has an I-gel in place by ems and an IO to left tibia with fluids infusing.  Pt was found face down  on floor approx 30 min pta, rcd 3 epi's and 1 bicarb pta by ems.  Pupils were fixed and dilated

## 2024-05-26 NOTE — ED PROVIDER NOTES
Encounter Date: 5/26/2024    SCRIBE #1 NOTE: I, Jihan Mosley, am scribing for, and in the presence of,  Don Granado MD. I have scribed the entire note.       History     Chief Complaint   Patient presents with    Cardiac Arrest     The pt is a 54 y/o male coming into the ED via [facility] post cardiac arrest after being found by family on the floor unresponsive and purple. The pt was found to be in V-fib and was cardioverted and transported to [facility] via EMS. Notes from [facility] state the family reports the pt having SOB all week and was last known well last night. They also report that the pt is a smoker and a drinker. At [facility] the pt was found to have ST elevation at V1 and V2 with reciprocal depression and unresponsive. The pt arrived via [facility] intubated. There are no other complaints at this time.    The history is provided by the EMS personnel. No  was used.     Review of patient's allergies indicates:  No Known Allergies  No past medical history on file.  No past surgical history on file.  No family history on file.  Social History     Tobacco Use    Smoking status: Every Day    Smokeless tobacco: Never   Substance Use Topics    Alcohol use: Yes     Comment: weekly    Drug use: Yes     Types: Marijuana     Review of Systems   Unable to perform ROS: Intubated   All other systems reviewed and are negative.      Physical Exam     Initial Vitals [05/26/24 1304]   BP Pulse Resp Temp SpO2   (!) 146/93 78 20 97.2 °F (36.2 °C) 98 %      MAP       --         Physical Exam    Nursing note and vitals reviewed.  Constitutional: He appears well-developed and well-nourished. He is not diaphoretic. No distress.   HENT:   Head: Normocephalic and atraumatic.   Nose: Nose normal.   Mouth/Throat: Oropharynx is clear and moist.   Eyes: Conjunctivae and EOM are normal. Pupils are equal, round, and reactive to light. No scleral icterus.   Neck: Neck supple. No JVD present.   Normal range of  motion.  Cardiovascular:  Normal rate, regular rhythm and normal heart sounds.     Exam reveals no gallop and no friction rub.       No murmur heard.  Pulmonary/Chest: Breath sounds normal. No stridor. No respiratory distress. He has no wheezes. He exhibits no tenderness.   ET tube in place   Abdominal: Abdomen is soft. Bowel sounds are normal. He exhibits no distension and no mass. There is no abdominal tenderness. There is no rebound and no guarding.   Musculoskeletal:         General: No tenderness or edema. Normal range of motion.      Cervical back: Normal range of motion and neck supple. Normal.      Thoracic back: Normal.      Lumbar back: Normal.     Lymphadenopathy:     He has no cervical adenopathy.   Neurological: He is alert and oriented to person, place, and time. He has normal strength. No cranial nerve deficit.   Skin: Skin is warm and dry. No rash noted. No pallor.   Psychiatric: He has a normal mood and affect. Thought content normal.         ED Course   Procedures  Labs Reviewed - No data to display       Imaging Results    None          Medications   LIDOcaine HCL 10 mg/ml (1%) injection (10 mLs Subcutaneous Given 5/26/24 1314)   heparin (porcine) injection (2,000 Units Intravenous Given 5/26/24 1321)   EPINEPHrine 0.1 mg/mL injection (1 mg Intravenous Given 5/26/24 1356)   amiodarone injection (300 mg Intravenous Given 5/26/24 1340)   sodium bicarbonate solution (50 mEq Intravenous Given 5/26/24 1352)   LIDOcaine (cardiac) injection (100 mg Intravenous Given 5/26/24 1352)   DOPamine 800 mg in dextrose 5 % 250 mL infusion (premix) ( Intravenous Stopped 5/26/24 1357)   0.9%  NaCl infusion ( Intravenous Stopped 5/26/24 1357)   iopamidoL (ISOVUE-370) injection (150 mLs Other Given 5/26/24 1420)     Medical Decision Making  POST-ARREST AND INTUBATED AND UNRESPONSIVE.  REPORTED STEMI.      DDX:  STEMI AS CAUSE OR RESULT OF ARREST VS OTHER    TO CATH LAB    Amount and/or Complexity of Data  Reviewed  Discussion of management or test interpretation with external provider(s): DISCUSSED WITH ADMITTING SERVICE.                Attending Attestation:           Physician Attestation for Scribe:  Physician Attestation Statement for Scribe #1: I, Don Granado MD, reviewed documentation, as scribed by Jihan Mosley in my presence, and it is both accurate and complete.                                    Clinical Impression:  Final diagnoses:  [I46.9] Cardiac arrest (Primary)  [I21.3] ST elevation myocardial infarction (STEMI), unspecified artery          ED Disposition Condition    Admit Stable                Don Granado MD  05/26/24 3648

## 2024-05-26 NOTE — Clinical Note
Body transferred to ICU2 for post-mortem care and pending notification of CRAVEN and family viewing.

## 2024-05-28 LAB — POC ACTIVATED CLOTTING TIME K: 166 SEC

## (undated) DEVICE — CONTRAST ISOVUE 370 100ML

## (undated) DEVICE — DECANTER FLUID TRNSF WHITE 9IN

## (undated) DEVICE — CHLORAPREP 10.5 ML APPLICATOR

## (undated) DEVICE — CATH DIAG IMPULSE 6FR FL4

## (undated) DEVICE — GLOVE SENSICARE PI SLT 6.5

## (undated) DEVICE — LEADWIRE DL DC EKG 10 PIN

## (undated) DEVICE — DRESSING TRANS 4X4 TEGADERM

## (undated) DEVICE — GUIDE VISTA 6FR JL 4.0

## (undated) DEVICE — CATH DIAG IMPULSE 6FR FR4

## (undated) DEVICE — KIT HEART ANGIO MFLD LEFT RUSH

## (undated) DEVICE — TOWEL OR XRAY BLUE 17X26IN

## (undated) DEVICE — Device

## (undated) DEVICE — WIRE CHOICE PT X SUPP 014X300

## (undated) DEVICE — CLIPPER BLADE MOD 4406 (CAREF)

## (undated) DEVICE — DEVICE BLUE DIAMOND INFL 20ML

## (undated) DEVICE — INTRODUCER KIT MICRO 4FR

## (undated) DEVICE — PROTECTOR ULNAR NERVE FOAM

## (undated) DEVICE — CATH EMERGE MR 30 X 2.00

## (undated) DEVICE — GLOVE SENSICARE PI SURG 8

## (undated) DEVICE — SHEATH INTRODUCER 6FR 11CM